# Patient Record
Sex: FEMALE | Race: WHITE | NOT HISPANIC OR LATINO | URBAN - METROPOLITAN AREA
[De-identification: names, ages, dates, MRNs, and addresses within clinical notes are randomized per-mention and may not be internally consistent; named-entity substitution may affect disease eponyms.]

---

## 2020-07-25 ENCOUNTER — TELEPHONE ENCOUNTER (OUTPATIENT)
Dept: URBAN - METROPOLITAN AREA CLINIC 13 | Facility: CLINIC | Age: 55
End: 2020-07-25

## 2020-07-25 RX ORDER — USTEKINUMAB 45 MG/.5ML
INJECT ML INJECTION, SOLUTION SUBCUTANEOUS
Qty: 1 | Refills: 5 | OUTPATIENT
End: 2020-04-09

## 2020-07-26 ENCOUNTER — TELEPHONE ENCOUNTER (OUTPATIENT)
Dept: URBAN - METROPOLITAN AREA CLINIC 13 | Facility: CLINIC | Age: 55
End: 2020-07-26

## 2020-07-26 RX ORDER — MULTIVITAMIN
TAKE 1 CAPSULE DAILY CAPSULE ORAL
Refills: 0 | Status: ACTIVE | COMMUNITY

## 2020-07-26 RX ORDER — USTEKINUMAB 90 MG/ML
MAINTENANCE: INJECT 1 SYRINGE SUBCUTANEOUSLY EVERY 8 WEEKS. REFRIGERATE. DO NOT FREEZE INJECTION, SOLUTION SUBCUTANEOUS
Qty: 1 | Refills: 3 | Status: ACTIVE | COMMUNITY
Start: 2019-05-28

## 2020-07-26 RX ORDER — PREDNISONE 10 MG/1
TABLET ORAL
Qty: 28 | Refills: 0 | Status: ACTIVE | COMMUNITY
Start: 2018-10-24

## 2020-07-26 RX ORDER — USTEKINUMAB 90 MG/ML
INJECTION, SOLUTION SUBCUTANEOUS
Qty: 1 | Refills: 0 | Status: ACTIVE | COMMUNITY
Start: 2019-03-09

## 2020-07-26 RX ORDER — USTEKINUMAB 130 MG/26ML
SOLUTION INTRAVENOUS
Qty: 104 | Refills: 0 | Status: ACTIVE | COMMUNITY
Start: 2017-12-29

## 2020-07-26 RX ORDER — BUDESONIDE 3 MG/1
CAPSULE, COATED PELLETS ORAL
Qty: 90 | Refills: 0 | Status: ACTIVE | COMMUNITY
Start: 2017-12-18

## 2020-07-26 RX ORDER — SULFAMETHOXAZOLE AND TRIMETHOPRIM 800; 160 MG/1; MG/1
TABLET ORAL
Qty: 14 | Refills: 0 | Status: ACTIVE | COMMUNITY
Start: 2020-02-19

## 2020-07-26 RX ORDER — USTEKINUMAB 90 MG/ML
INJECTION, SOLUTION SUBCUTANEOUS
Qty: 1 | Refills: 0 | Status: ACTIVE | COMMUNITY
Start: 2018-04-06

## 2020-10-05 ENCOUNTER — ERX REFILL RESPONSE (OUTPATIENT)
Age: 55
End: 2020-10-05

## 2020-10-05 RX ORDER — USTEKINUMAB 90 MG/ML
MAINTENANCE: INJECT 1 SYRINGE SUBCUTANEOUSLY EVERY 8 WEEKS. REFRIGERATE. DO NOT FREEZE INJECTION, SOLUTION SUBCUTANEOUS
Qty: 1 | Refills: 2

## 2021-05-14 ENCOUNTER — ERX REFILL RESPONSE (OUTPATIENT)
Dept: URBAN - METROPOLITAN AREA CLINIC 113 | Facility: CLINIC | Age: 56
End: 2021-05-14

## 2021-05-14 RX ORDER — USTEKINUMAB 90 MG/ML
MAINTENANCE: INJECT 1 SYRINGE SUBCUTANEOUSLY EVERY 8 WEEKS. REFRIGERATE. DO NOT FREEZE INJECTION, SOLUTION SUBCUTANEOUS
Qty: 1 | Refills: 1

## 2021-07-15 ENCOUNTER — OFFICE VISIT (OUTPATIENT)
Dept: URBAN - METROPOLITAN AREA CLINIC 107 | Facility: CLINIC | Age: 56
End: 2021-07-15
Payer: COMMERCIAL

## 2021-07-15 VITALS
HEIGHT: 64 IN | DIASTOLIC BLOOD PRESSURE: 87 MMHG | TEMPERATURE: 97.9 F | WEIGHT: 211.8 LBS | SYSTOLIC BLOOD PRESSURE: 138 MMHG | BODY MASS INDEX: 36.16 KG/M2 | RESPIRATION RATE: 18 BRPM | HEART RATE: 64 BPM

## 2021-07-15 DIAGNOSIS — K50.90 CROHN'S DISEASE IN REMISSION: ICD-10-CM

## 2021-07-15 PROCEDURE — 99213 OFFICE O/P EST LOW 20 MIN: CPT | Performed by: INTERNAL MEDICINE

## 2021-07-15 RX ORDER — MULTIVITAMIN
TAKE 1 CAPSULE DAILY CAPSULE ORAL
Refills: 0 | Status: ACTIVE | COMMUNITY

## 2021-07-15 RX ORDER — USTEKINUMAB 130 MG/26ML
SOLUTION INTRAVENOUS
Qty: 104 | Refills: 0 | Status: ACTIVE | COMMUNITY
Start: 2017-12-29

## 2021-07-15 RX ORDER — SULFAMETHOXAZOLE AND TRIMETHOPRIM 800; 160 MG/1; MG/1
TABLET ORAL
Qty: 14 | Refills: 0 | Status: ON HOLD | COMMUNITY
Start: 2020-02-19

## 2021-07-15 RX ORDER — BUDESONIDE 3 MG/1
CAPSULE, COATED PELLETS ORAL
Qty: 90 | Refills: 0 | Status: ON HOLD | COMMUNITY
Start: 2017-12-18

## 2021-07-15 RX ORDER — USTEKINUMAB 90 MG/ML
MAINTENANCE: INJECT 1 SYRINGE SUBCUTANEOUSLY EVERY 8 WEEKS. REFRIGERATE. DO NOT FREEZE INJECTION, SOLUTION SUBCUTANEOUS
Qty: 1 | Refills: 1 | Status: ACTIVE | COMMUNITY

## 2021-07-15 RX ORDER — PREDNISONE 10 MG/1
TABLET ORAL
Qty: 28 | Refills: 0 | Status: ON HOLD | COMMUNITY
Start: 2018-10-24

## 2021-07-15 NOTE — HPI-TODAY'S VISIT:
Ms. Junior is a 56-year-old woman who was diagnosed with colonic Crohn's disease at age of 40 on Stelara every 8 weeks, presenting for routine follow-up. Her last office visit was on April 9, 2020, and he was doing well at that time.  She denied  diarrhea, rectal bleeding, fever, chills, sweats, or abdominal pain. Her weight was up 1 pound.   She was previously seen on March 18, 2019. At that time, she was also doing well clinically. We ordered labs, and discussed consideration of surveillance colonoscopy.  Previous studies Colonoscopy in September of 2017 by Dr. Piper : good prep, normal mucosa from the terminal ileum to mid colon, severe colonic stenosis at 60 cm from the anus status post dilatation to 20 mm with a balloon dilator, and anal stricture that was dilated manually. Biopsies subsequently requested and found to be negative for any significant dysplasia or other pathology. These biopsies simply showed chronic inflammation.  Labs 5/18/18: CRP 7.74.   Labs from November 7, 2018 were notable for a white blood cell count of 11,700, hemoglobin 13.7, hematocrit of 42.9%, platelet count 275,000, a complete metabolic panel which was essentially normal except for a slightly elevated glucose of 121, vitamin B12 level normal at 1141 pg/mL, folate level normal at greater than 20, vitamin D level normal at 36.7 ng/mL, sedimentation rate of 8, and a C-reactive protein of 7.7.  The patient remains asymptomatic. She denies rectal bleeding, fever, chills, sweats, or abdominal pain. She has had some loose stools ever since she took an antibiotic for a urinary tract infection about 3 or 4 weeks ago.

## 2021-10-04 ENCOUNTER — ERX REFILL RESPONSE (OUTPATIENT)
Dept: URBAN - METROPOLITAN AREA CLINIC 113 | Facility: CLINIC | Age: 56
End: 2021-10-04

## 2021-10-04 RX ORDER — USTEKINUMAB 90 MG/ML
MAINTENANCE: INJECT 1 SYRINGE SUBCUTANEOUSLY EVERY 8 WEEKS. REFRIGERATE. DO NOT FREEZE INJECTION, SOLUTION SUBCUTANEOUS
Qty: 1 | Refills: 1 | OUTPATIENT

## 2021-10-04 RX ORDER — USTEKINUMAB 90 MG/ML
MAINTENANCE: INJECT 1 SYRINGE SUBCUTANEOUSLY EVERY 8 WEEKS. REFRIGERATE. DO NOT FREEZE INJECTION, SOLUTION SUBCUTANEOUS
Qty: 1 | Refills: 6 | OUTPATIENT

## 2021-12-14 ENCOUNTER — TELEPHONE ENCOUNTER (OUTPATIENT)
Dept: URBAN - METROPOLITAN AREA CLINIC 107 | Facility: CLINIC | Age: 56
End: 2021-12-14

## 2021-12-14 ENCOUNTER — TELEPHONE ENCOUNTER (OUTPATIENT)
Dept: URBAN - METROPOLITAN AREA CLINIC 40 | Facility: CLINIC | Age: 56
End: 2021-12-14

## 2022-09-25 ENCOUNTER — ERX REFILL RESPONSE (OUTPATIENT)
Dept: URBAN - METROPOLITAN AREA CLINIC 113 | Facility: CLINIC | Age: 57
End: 2022-09-25

## 2022-09-25 RX ORDER — USTEKINUMAB 90 MG/ML
MAINTENANCE: INJECT 1 SYRINGE UNDER THE SKIN EVERY 8 WEEKS. REFRIGERATE. DO NOT FREEZE INJECTION, SOLUTION SUBCUTANEOUS
Qty: 1 | Refills: 5 | OUTPATIENT

## 2022-09-25 RX ORDER — USTEKINUMAB 90 MG/ML
MAINTENANCE: INJECT 1 SYRINGE SUBCUTANEOUSLY EVERY 8 WEEKS. REFRIGERATE. DO NOT FREEZE INJECTION, SOLUTION SUBCUTANEOUS
Qty: 1 | Refills: 6 | OUTPATIENT

## 2023-01-05 ENCOUNTER — OFFICE VISIT (OUTPATIENT)
Dept: URBAN - METROPOLITAN AREA CLINIC 107 | Facility: CLINIC | Age: 58
End: 2023-01-05
Payer: COMMERCIAL

## 2023-01-05 ENCOUNTER — WEB ENCOUNTER (OUTPATIENT)
Dept: URBAN - METROPOLITAN AREA CLINIC 107 | Facility: CLINIC | Age: 58
End: 2023-01-05

## 2023-01-05 VITALS
HEIGHT: 64 IN | HEART RATE: 73 BPM | WEIGHT: 222 LBS | SYSTOLIC BLOOD PRESSURE: 141 MMHG | BODY MASS INDEX: 37.9 KG/M2 | DIASTOLIC BLOOD PRESSURE: 97 MMHG | TEMPERATURE: 98 F

## 2023-01-05 DIAGNOSIS — K50.90 CROHN'S DISEASE IN REMISSION: ICD-10-CM

## 2023-01-05 PROBLEM — 426549001: Status: ACTIVE | Noted: 2021-07-15

## 2023-01-05 PROCEDURE — 99213 OFFICE O/P EST LOW 20 MIN: CPT | Performed by: INTERNAL MEDICINE

## 2023-01-05 RX ORDER — USTEKINUMAB 90 MG/ML
MAINTENANCE: INJECT 1 SYRINGE UNDER THE SKIN EVERY 8 WEEKS. REFRIGERATE. DO NOT FREEZE INJECTION, SOLUTION SUBCUTANEOUS
Qty: 1 | Refills: 5 | Status: ACTIVE | COMMUNITY

## 2023-01-05 RX ORDER — MULTIVITAMIN
TAKE 1 CAPSULE DAILY CAPSULE ORAL
Refills: 0 | Status: ACTIVE | COMMUNITY

## 2023-01-05 NOTE — HPI-TODAY'S VISIT:
Ms. Junior is a 57-year-old woman who was diagnosed with colonic Crohn's disease at age of 40 on Stelara every 8 weeks, presenting for routine follow-up. Her last office visit was on 7/15/21 and she was doing well at that time.  She denied  diarrhea, rectal bleeding, fever, chills, sweats, or abdominal pain. Lab work done at that time on August 20, 2021 included a normal CBC, a normal complete metabolic panel, vitamin D level of 50, a sedimentation rate of 41, C-reactive protein of 8, and a vitamin B12 level of 1294.   She was previously seen on April 9, 2020 and on March 18, 2019. At those times, she was also doing well clinically. We ordered labs, and discussed consideration of surveillance colonoscopy.  Previous studies Colonoscopy in September of 2017 by Dr. Piper : good prep, normal mucosa from the terminal ileum to mid colon, severe colonic stenosis at 60 cm from the anus status post dilatation to 20 mm with a balloon dilator, and anal stricture that was dilated manually. Biopsies subsequently requested and found to be negative for any significant dysplasia or other pathology. These biopsies simply showed chronic inflammation.  Labs 5/18/18: CRP 7.74.   Labs from November 7, 2018 were notable for a white blood cell count of 11,700, hemoglobin 13.7, hematocrit of 42.9%, platelet count 275,000, a complete metabolic panel which was essentially normal except for a slightly elevated glucose of 121, vitamin B12 level normal at 1141 pg/mL, folate level normal at greater than 20, vitamin D level normal at 36.7 ng/mL, sedimentation rate of 8, and a C-reactive protein of 7.7.  The patient remains asymptomatic. She denies rectal bleeding, fever, chills, sweats, or abdominal pain. She has had some loose stools ever since she took an antibiotic for a urinary tract infection about 3 or 4 weeks ago.

## 2023-01-31 ENCOUNTER — TELEPHONE ENCOUNTER (OUTPATIENT)
Dept: URBAN - METROPOLITAN AREA CLINIC 6 | Facility: CLINIC | Age: 58
End: 2023-01-31

## 2023-01-31 RX ORDER — USTEKINUMAB 90 MG/ML
MAINTENANCE: INJECT 1 SYRINGE UNDER THE SKIN EVERY 8 WEEKS. REFRIGERATE. DO NOT FREEZE INJECTION, SOLUTION SUBCUTANEOUS
Qty: 1 | Refills: 5
End: 2024-01-03

## 2023-04-11 ENCOUNTER — OFFICE VISIT (OUTPATIENT)
Dept: URBAN - METROPOLITAN AREA CLINIC 107 | Facility: CLINIC | Age: 58
End: 2023-04-11
Payer: COMMERCIAL

## 2023-04-11 ENCOUNTER — WEB ENCOUNTER (OUTPATIENT)
Dept: URBAN - METROPOLITAN AREA CLINIC 107 | Facility: CLINIC | Age: 58
End: 2023-04-11

## 2023-04-11 ENCOUNTER — DASHBOARD ENCOUNTERS (OUTPATIENT)
Age: 58
End: 2023-04-11

## 2023-04-11 ENCOUNTER — LAB OUTSIDE AN ENCOUNTER (OUTPATIENT)
Dept: URBAN - METROPOLITAN AREA CLINIC 107 | Facility: CLINIC | Age: 58
End: 2023-04-11

## 2023-04-11 VITALS
BODY MASS INDEX: 37.56 KG/M2 | RESPIRATION RATE: 18 BRPM | HEART RATE: 68 BPM | SYSTOLIC BLOOD PRESSURE: 154 MMHG | TEMPERATURE: 97.5 F | HEIGHT: 64 IN | WEIGHT: 220 LBS | DIASTOLIC BLOOD PRESSURE: 93 MMHG

## 2023-04-11 DIAGNOSIS — K50.90 CROHN'S DISEASE IN REMISSION: ICD-10-CM

## 2023-04-11 PROCEDURE — 99214 OFFICE O/P EST MOD 30 MIN: CPT | Performed by: INTERNAL MEDICINE

## 2023-04-11 RX ORDER — USTEKINUMAB 90 MG/ML
MAINTENANCE: INJECT 1 SYRINGE UNDER THE SKIN EVERY 8 WEEKS. REFRIGERATE. DO NOT FREEZE INJECTION, SOLUTION SUBCUTANEOUS
Qty: 1 | Refills: 5 | Status: ACTIVE | COMMUNITY
End: 2024-01-03

## 2023-04-11 RX ORDER — MULTIVITAMIN
TAKE 1 CAPSULE DAILY CAPSULE ORAL
Refills: 0 | Status: ACTIVE | COMMUNITY

## 2023-04-11 NOTE — HPI-TODAY'S VISIT:
Ms. Junior is a 57-year-old woman who was diagnosed with colonic Crohn's disease at age of 40 on Stelara every 8 weeks, presenting for routine follow-up. She wa slast seen on 1/5/23.  Her prior office visit was on 7/15/21 and she was doing well at that time.  She denied  diarrhea, rectal bleeding, fever, chills, sweats, or abdominal pain. Lab work done at that time on August 20, 2021 included a normal CBC, a normal complete metabolic panel, vitamin D level of 50, a sedimentation rate of 41, C-reactive protein of 8, and a vitamin B12 level of 1294.   She was previously seen on April 9, 2020 and on March 18, 2019. At those times, she was also doing well clinically. We ordered labs, and discussed consideration of surveillance colonoscopy.  The patient remained asymptomatic on 1/5/23. She denied rectal bleeding, fever, chills, sweats, or abdominal pain.   The patient had labs ordered at her last visit.  Stelara was continued.  Labs were ordered.  She had this done on January 6, 2023.  Her CBC was normal, with a white blood cell count of 6500, hemoglobin 13.6, hematocrit 41.9% and a platelet count was 286,000.  Complete metabolic panel was also normal.  Her alkaline phosphatase is 74, AST 23, ALT 19, and C-reactive protein was 9.  I do not see B12 and vitamin D levels.  The patient tells me that she has not had a bone density done in 19 years.  She is postmenopausal after hysterectomy.  Once again, she has elected to defer colonoscopy, which was offered her for surveillance/screening purposes today.  She knows that she is overdue for screening/surveillance colonoscopy.  Previous studies Colonoscopy in September of 2017 by Dr. Piper : good prep, normal mucosa from the terminal ileum to mid colon, severe colonic stenosis at 60 cm from the anus status post dilatation to 20 mm with a balloon dilator, and anal stricture that was dilated manually. Biopsies subsequently requested and found to be negative for any significant dysplasia or other pathology. These biopsies simply showed chronic inflammation.  Labs 5/18/18: CRP 7.74.   Labs from November 7, 2018 were notable for a white blood cell count of 11,700, hemoglobin 13.7, hematocrit of 42.9%, platelet count 275,000, a complete metabolic panel which was essentially normal except for a slightly elevated glucose of 121, vitamin B12 level normal at 1141 pg/mL, folate level normal at greater than 20, vitamin D level normal at 36.7 ng/mL, sedimentation rate of 8, and a C-reactive protein of 7.7.

## 2023-04-25 ENCOUNTER — TELEPHONE ENCOUNTER (OUTPATIENT)
Dept: URBAN - METROPOLITAN AREA CLINIC 113 | Facility: CLINIC | Age: 58
End: 2023-04-25

## 2024-01-16 ENCOUNTER — TELEPHONE ENCOUNTER (OUTPATIENT)
Dept: URBAN - METROPOLITAN AREA CLINIC 107 | Facility: CLINIC | Age: 59
End: 2024-01-16

## 2024-01-16 ENCOUNTER — ERX REFILL RESPONSE (OUTPATIENT)
Dept: URBAN - METROPOLITAN AREA CLINIC 113 | Facility: CLINIC | Age: 59
End: 2024-01-16

## 2024-01-16 RX ORDER — USTEKINUMAB 90 MG/ML
MAINTENANCE: INJECT 1 SYRINGE SUBCUTANEOUSLY EVERY 8 WEEKS. REFRIGERATE. DO NOT FREEZE INJECTION, SOLUTION SUBCUTANEOUS
Qty: 1 | Refills: 6

## 2024-01-16 RX ORDER — USTEKINUMAB 90 MG/ML
MAINTENANCE: INJECT 1 SYRINGE SUBCUTANEOUSLY EVERY 8 WEEKS. REFRIGERATE. DO NOT FREEZE INJECTION, SOLUTION SUBCUTANEOUS
Qty: 1 | Refills: 7 | OUTPATIENT

## 2024-01-16 RX ORDER — USTEKINUMAB 90 MG/ML
MAINTENANCE: INJECT 1 SYRINGE SUBCUTANEOUSLY EVERY 8 WEEKS. REFRIGERATE. DO NOT FREEZE INJECTION, SOLUTION SUBCUTANEOUS
Qty: 1 | Refills: 6 | OUTPATIENT

## 2025-02-27 ENCOUNTER — TELEPHONE ENCOUNTER (OUTPATIENT)
Dept: URBAN - METROPOLITAN AREA CLINIC 113 | Facility: CLINIC | Age: 60
End: 2025-02-27

## 2025-03-20 ENCOUNTER — OFFICE VISIT (OUTPATIENT)
Dept: URBAN - METROPOLITAN AREA CLINIC 107 | Facility: CLINIC | Age: 60
End: 2025-03-20
Payer: COMMERCIAL

## 2025-03-20 ENCOUNTER — LAB OUTSIDE AN ENCOUNTER (OUTPATIENT)
Dept: URBAN - METROPOLITAN AREA CLINIC 107 | Facility: CLINIC | Age: 60
End: 2025-03-20

## 2025-03-20 VITALS
DIASTOLIC BLOOD PRESSURE: 93 MMHG | SYSTOLIC BLOOD PRESSURE: 153 MMHG | BODY MASS INDEX: 37.25 KG/M2 | WEIGHT: 218.2 LBS | HEIGHT: 64 IN | TEMPERATURE: 97.5 F | HEART RATE: 78 BPM

## 2025-03-20 DIAGNOSIS — Z79.899 HIGH RISK MEDICATION USE: ICD-10-CM

## 2025-03-20 DIAGNOSIS — K90.89 BILE SALT-INDUCED DIARRHEA: ICD-10-CM

## 2025-03-20 DIAGNOSIS — Z12.11 COLON CANCER SCREENING: ICD-10-CM

## 2025-03-20 DIAGNOSIS — K50.90 CROHN'S DISEASE IN REMISSION: ICD-10-CM

## 2025-03-20 PROBLEM — 69980003: Status: ACTIVE | Noted: 2025-03-20

## 2025-03-20 PROCEDURE — 99214 OFFICE O/P EST MOD 30 MIN: CPT | Performed by: NURSE PRACTITIONER

## 2025-03-20 RX ORDER — USTEKINUMAB 90 MG/ML
MAINTENANCE: INJECT 1 SYRINGE SUBCUTANEOUSLY EVERY 8 WEEKS. REFRIGERATE. DO NOT FREEZE INJECTION, SOLUTION SUBCUTANEOUS
Qty: 1 | Refills: 6

## 2025-03-20 RX ORDER — USTEKINUMAB 90 MG/ML
MAINTENANCE: INJECT 1 SYRINGE SUBCUTANEOUSLY EVERY 8 WEEKS. REFRIGERATE. DO NOT FREEZE INJECTION, SOLUTION SUBCUTANEOUS
Qty: 1 | Refills: 6 | Status: ACTIVE | COMMUNITY

## 2025-03-20 RX ORDER — LOSARTAN POTASSIUM 25 MG/1
1 TABLET TABLET, FILM COATED ORAL ONCE A DAY
Status: ACTIVE | COMMUNITY

## 2025-03-20 RX ORDER — MULTIVITAMIN
TAKE 1 CAPSULE DAILY CAPSULE ORAL
Refills: 0 | Status: ACTIVE | COMMUNITY

## 2025-03-20 RX ORDER — SODIUM, POTASSIUM,MAG SULFATES 17.5-3.13G
AS DIRECTED SOLUTION, RECONSTITUTED, ORAL ORAL
Qty: 1 KIT | Refills: 0 | OUTPATIENT
Start: 2025-03-20

## 2025-03-20 NOTE — HPI-TODAY'S VISIT:
Ms. Junior is a 59-year-old woman who was diagnosed with colonic Crohn's disease at age of 40 on Stelara every 8 weeks  Last seen 4/11/2023. Crohn's in remission labs ordered including bone density advised to continue Stelara every 8 weeks. Recommend align capsule daily.  Lab work done at that time on August 20, 2021, included a normal CBC, a normal complete metabolic panel, vitamin D level of 50, a sedimentation rate of 41, C-reactive protein of 8, and a vitamin B12 level of 1294.   She was previously seen on April 9, 2020 and on March 18, 2019. At those times, she was also doing well clinically. We ordered labs, and discussed consideration of surveillance colonoscopy.  The patient remained asymptomatic on 1/5/23. She denied rectal bleeding, fever, chills, sweats, or abdominal pain.   The patient had labs ordered at her last visit.  Stelara was continued.  Labs were ordered.  She had this done on January 6, 2023.  Her CBC was normal, with a white blood cell count of 6500, hemoglobin 13.6, hematocrit 41.9% and a platelet count was 286,000.  Complete metabolic panel was also normal.  Her alkaline phosphatase is 74, AST 23, ALT 19, and C-reactive protein was 9.  I do not see B12 and vitamin D levels.  The patient tells me that she has not had a bone density done in 19 years.  She is postmenopausal after hysterectomy.  Once again, she has elected to defer colonoscopy, which was offered her for surveillance/screening purposes today.  She knows that she is overdue for screening/surveillance colonoscopy.  Previous studies Colonoscopy in September of 2017 by Dr. Piper : good prep, normal mucosa from the terminal ileum to mid colon, severe colonic stenosis at 60 cm from the anus status post dilatation to 20 mm with a balloon dilator, and anal stricture that was dilated manually. Biopsies subsequently requested and found to be negative for any significant dysplasia or other pathology. These biopsies simply showed chronic inflammation.  Labs 5/18/18: CRP 7.74.   Labs from November 7, 2018 were notable for a white blood cell count of 11,700, hemoglobin 13.7, hematocrit of 42.9%, platelet count 275,000, a complete metabolic panel which was essentially normal except for a slightly elevated glucose of 121, vitamin B12 level normal at 1141 pg/mL, folate level normal at greater than 20, vitamin D level normal at 36.7 ng/mL, sedimentation rate of 8, and a C-reactive protein of 7.7. 59-year-old female here for annual follow-up for Crohn's in remission with Stelara.  Interval history, 3/20/2025 Labs 1/6/2023.  CBC normal with Hgb 13.6.  CMP: Sodium 145. Labs 4/20/2023.  Vitamin D normal. Bone density scan 4/20/2023 was normal. Labs 3/11/2025.  CBC normal with Hgb 13.9.  CMP and TSH normal.  She is doing well.  1 formed BM a day. Started colesevelam for loose stools in January after she eats which has helped.  No melena, hematochezia, mucous or abdominal pain. She sees a dermatologist annually.

## 2025-03-21 ENCOUNTER — TELEPHONE ENCOUNTER (OUTPATIENT)
Dept: URBAN - METROPOLITAN AREA CLINIC 107 | Facility: CLINIC | Age: 60
End: 2025-03-21

## 2025-03-31 ENCOUNTER — TELEPHONE ENCOUNTER (OUTPATIENT)
Dept: URBAN - METROPOLITAN AREA CLINIC 107 | Facility: CLINIC | Age: 60
End: 2025-03-31

## 2025-04-07 ENCOUNTER — WEB ENCOUNTER (OUTPATIENT)
Dept: URBAN - METROPOLITAN AREA CLINIC 107 | Facility: CLINIC | Age: 60
End: 2025-04-07

## 2025-04-25 ENCOUNTER — OFFICE VISIT (OUTPATIENT)
Dept: URBAN - METROPOLITAN AREA SURGERY CENTER 25 | Facility: SURGERY CENTER | Age: 60
End: 2025-04-25
Payer: COMMERCIAL

## 2025-04-25 ENCOUNTER — CLAIMS CREATED FROM THE CLAIM WINDOW (OUTPATIENT)
Dept: URBAN - METROPOLITAN AREA CLINIC 4 | Facility: CLINIC | Age: 60
End: 2025-04-25
Payer: COMMERCIAL

## 2025-04-25 DIAGNOSIS — K63.89 OTHER SPECIFIED DISEASES OF INTESTINE: ICD-10-CM

## 2025-04-25 DIAGNOSIS — K51.40 INFLAMMATORY POLYPS OF COLON WITHOUT COMPLICATIONS: ICD-10-CM

## 2025-04-25 DIAGNOSIS — K56.699 STENOSIS COLON: ICD-10-CM

## 2025-04-25 DIAGNOSIS — K50.10 CROHN'S DISEASE OF LARGE INTESTINE WITHOUT COMPLICATIONS: ICD-10-CM

## 2025-04-25 PROCEDURE — 45385 COLONOSCOPY W/LESION REMOVAL: CPT | Performed by: INTERNAL MEDICINE

## 2025-04-25 PROCEDURE — 45380 COLONOSCOPY AND BIOPSY: CPT | Performed by: INTERNAL MEDICINE

## 2025-04-25 PROCEDURE — 88305 TISSUE EXAM BY PATHOLOGIST: CPT | Performed by: PATHOLOGY

## 2025-04-25 RX ORDER — SODIUM, POTASSIUM,MAG SULFATES 17.5-3.13G
AS DIRECTED SOLUTION, RECONSTITUTED, ORAL ORAL
Qty: 1 KIT | Refills: 0 | Status: ACTIVE | COMMUNITY
Start: 2025-03-20

## 2025-04-25 RX ORDER — LOSARTAN POTASSIUM 25 MG/1
1 TABLET TABLET, FILM COATED ORAL ONCE A DAY
Status: ACTIVE | COMMUNITY

## 2025-04-25 RX ORDER — MULTIVITAMIN
TAKE 1 CAPSULE DAILY CAPSULE ORAL
Refills: 0 | Status: ACTIVE | COMMUNITY

## 2025-04-25 RX ORDER — USTEKINUMAB 90 MG/ML
MAINTENANCE: INJECT 1 SYRINGE SUBCUTANEOUSLY EVERY 8 WEEKS. REFRIGERATE. DO NOT FREEZE INJECTION, SOLUTION SUBCUTANEOUS
Qty: 1 | Refills: 6 | Status: ACTIVE | COMMUNITY

## 2025-04-28 ENCOUNTER — TELEPHONE ENCOUNTER (OUTPATIENT)
Dept: URBAN - METROPOLITAN AREA CLINIC 113 | Facility: CLINIC | Age: 60
End: 2025-04-28

## 2025-04-29 ENCOUNTER — TELEPHONE ENCOUNTER (OUTPATIENT)
Dept: URBAN - METROPOLITAN AREA CLINIC 107 | Facility: CLINIC | Age: 60
End: 2025-04-29

## 2025-04-29 RX ORDER — USTEKINUMAB 90 MG/ML
MAINTENANCE: INJECT 1 SYRINGE SUBCUTANEOUSLY EVERY 8 WEEKS. REFRIGERATE. DO NOT FREEZE INJECTION, SOLUTION SUBCUTANEOUS
Qty: 1 | Refills: 6
End: 2026-05-27